# Patient Record
Sex: MALE | Race: WHITE | NOT HISPANIC OR LATINO | Employment: STUDENT | URBAN - METROPOLITAN AREA
[De-identification: names, ages, dates, MRNs, and addresses within clinical notes are randomized per-mention and may not be internally consistent; named-entity substitution may affect disease eponyms.]

---
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
## 2021-02-17 ENCOUNTER — OFFICE VISIT (OUTPATIENT)
Dept: FAMILY MEDICINE CLINIC | Facility: CLINIC | Age: 21
End: 2021-02-17

## 2021-02-17 VITALS
WEIGHT: 192 LBS | HEIGHT: 75 IN | RESPIRATION RATE: 16 BRPM | BODY MASS INDEX: 23.87 KG/M2 | TEMPERATURE: 97.6 F | DIASTOLIC BLOOD PRESSURE: 76 MMHG | SYSTOLIC BLOOD PRESSURE: 132 MMHG | OXYGEN SATURATION: 97 % | HEART RATE: 74 BPM

## 2021-02-17 DIAGNOSIS — T14.90XA MUSCLE INJURY: Primary | ICD-10-CM

## 2021-02-17 PROCEDURE — 99213 OFFICE O/P EST LOW 20 MIN: CPT | Performed by: FAMILY MEDICINE

## 2021-02-17 NOTE — PROGRESS NOTES
Subjective   Nahum Higginbotham is a 20 y.o. male.   Chest Pain    History of Present Illness   Nahum is here w/ c/o pain in his sternal area.  He states he believes he may have strained the area.   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  He states that last night he noticed when a box was particularly heavy and believes that the pain started after lifting that particularly heavy box.  Pain is superficial and does not radiate.  He has pain with deep breathing and he has tenderness when he presses on his chest in the sternal area.  He is not short of breath  He is concerned that he should take some time and not lift heavy boxes for a while and let this heal.  The following portions of the patient's history were reviewed and updated as appropriate: allergies, current medications, past family history, past medical history, past social history, past surgical history and problem list.    Review of Systems   Constitutional: Negative.    HENT: Negative.    Eyes: Negative.    Respiratory: Negative.    Cardiovascular: Negative.         Chest wall pain   Genitourinary: Negative.    Skin: Negative.    Neurological: Negative.        Objective   Physical Exam  Vitals signs and nursing note reviewed.   Constitutional:       Appearance: He is well-developed.   HENT:      Head: Normocephalic and atraumatic.   Eyes:      Pupils: Pupils are equal, round, and reactive to light.   Neck:      Musculoskeletal: Normal range of motion.   Cardiovascular:      Rate and Rhythm: Normal rate and regular rhythm.   Pulmonary:      Effort: Pulmonary effort is normal.   Chest:      Chest wall: Tenderness present.   Skin:     General: Skin is warm and dry.      Findings: No rash.   Neurological:      Mental Status: He is alert and oriented to person, place, and time.   Psychiatric:         Behavior: Behavior normal.           Assessment/Plan   Problem List Items Addressed This Visit     None      Visit Diagnoses      Muscle injury    -  Primary      I have advised him to take ibuprofen and to try to discuss with his employer some alternative work that he can do to rest this area and not lift heavy boxes for the next week or so.         Return if symptoms worsen or fail to improve.